# Patient Record
Sex: MALE | Race: BLACK OR AFRICAN AMERICAN | NOT HISPANIC OR LATINO | Employment: UNEMPLOYED | URBAN - METROPOLITAN AREA
[De-identification: names, ages, dates, MRNs, and addresses within clinical notes are randomized per-mention and may not be internally consistent; named-entity substitution may affect disease eponyms.]

---

## 2023-08-18 ENCOUNTER — HOSPITAL ENCOUNTER (INPATIENT)
Facility: CLINIC | Age: 58
LOS: 3 days | Discharge: HOME | End: 2023-08-21
Attending: FAMILY MEDICINE | Admitting: PSYCHIATRY & NEUROLOGY
Payer: COMMERCIAL

## 2023-08-18 DIAGNOSIS — F10.239 ALCOHOL DEPENDENCE WITH WITHDRAWAL WITH COMPLICATION (H): ICD-10-CM

## 2023-08-18 LAB
ALBUMIN SERPL BCG-MCNC: 4.6 G/DL (ref 3.5–5.2)
ALP SERPL-CCNC: 76 U/L (ref 40–129)
ALT SERPL W P-5'-P-CCNC: 18 U/L (ref 0–70)
AMPHETAMINES UR QL SCN: ABNORMAL
ANION GAP SERPL CALCULATED.3IONS-SCNC: 10 MMOL/L (ref 7–15)
AST SERPL W P-5'-P-CCNC: 21 U/L (ref 0–45)
BARBITURATES UR QL SCN: ABNORMAL
BASOPHILS # BLD AUTO: 0 10E3/UL (ref 0–0.2)
BASOPHILS NFR BLD AUTO: 1 %
BENZODIAZ UR QL SCN: ABNORMAL
BILIRUB SERPL-MCNC: 1 MG/DL
BUN SERPL-MCNC: 11.7 MG/DL (ref 8–23)
BZE UR QL SCN: ABNORMAL
CALCIUM SERPL-MCNC: 9.8 MG/DL (ref 8.6–10)
CANNABINOIDS UR QL SCN: ABNORMAL
CHLORIDE SERPL-SCNC: 105 MMOL/L (ref 98–107)
CREAT SERPL-MCNC: 1.29 MG/DL (ref 0.67–1.17)
DEPRECATED HCO3 PLAS-SCNC: 25 MMOL/L (ref 22–29)
EOSINOPHIL # BLD AUTO: 0.1 10E3/UL (ref 0–0.7)
EOSINOPHIL NFR BLD AUTO: 2 %
ERYTHROCYTE [DISTWIDTH] IN BLOOD BY AUTOMATED COUNT: 12.7 % (ref 10–15)
ETHANOL SERPL-MCNC: <0.01 G/DL
GFR SERPL CREATININE-BSD FRML MDRD: 64 ML/MIN/1.73M2
GLUCOSE SERPL-MCNC: 100 MG/DL (ref 70–99)
HCT VFR BLD AUTO: 45.7 % (ref 40–53)
HGB BLD-MCNC: 15.6 G/DL (ref 13.3–17.7)
IMM GRANULOCYTES # BLD: 0 10E3/UL
IMM GRANULOCYTES NFR BLD: 0 %
LIPASE SERPL-CCNC: 32 U/L (ref 13–60)
LYMPHOCYTES # BLD AUTO: 1.9 10E3/UL (ref 0.8–5.3)
LYMPHOCYTES NFR BLD AUTO: 43 %
MCH RBC QN AUTO: 32 PG (ref 26.5–33)
MCHC RBC AUTO-ENTMCNC: 34.1 G/DL (ref 31.5–36.5)
MCV RBC AUTO: 94 FL (ref 78–100)
MONOCYTES # BLD AUTO: 0.4 10E3/UL (ref 0–1.3)
MONOCYTES NFR BLD AUTO: 8 %
NEUTROPHILS # BLD AUTO: 2.1 10E3/UL (ref 1.6–8.3)
NEUTROPHILS NFR BLD AUTO: 46 %
NRBC # BLD AUTO: 0 10E3/UL
NRBC BLD AUTO-RTO: 0 /100
OPIATES UR QL SCN: ABNORMAL
PLATELET # BLD AUTO: 263 10E3/UL (ref 150–450)
POTASSIUM SERPL-SCNC: 4.3 MMOL/L (ref 3.4–5.3)
PROT SERPL-MCNC: 7.8 G/DL (ref 6.4–8.3)
RBC # BLD AUTO: 4.87 10E6/UL (ref 4.4–5.9)
SODIUM SERPL-SCNC: 140 MMOL/L (ref 136–145)
WBC # BLD AUTO: 4.5 10E3/UL (ref 4–11)

## 2023-08-18 PROCEDURE — 82077 ASSAY SPEC XCP UR&BREATH IA: CPT | Performed by: FAMILY MEDICINE

## 2023-08-18 PROCEDURE — 80307 DRUG TEST PRSMV CHEM ANLYZR: CPT | Performed by: FAMILY MEDICINE

## 2023-08-18 PROCEDURE — 36415 COLL VENOUS BLD VENIPUNCTURE: CPT | Performed by: FAMILY MEDICINE

## 2023-08-18 PROCEDURE — 250N000013 HC RX MED GY IP 250 OP 250 PS 637: Performed by: FAMILY MEDICINE

## 2023-08-18 PROCEDURE — 99285 EMERGENCY DEPT VISIT HI MDM: CPT | Performed by: FAMILY MEDICINE

## 2023-08-18 PROCEDURE — HZ2ZZZZ DETOXIFICATION SERVICES FOR SUBSTANCE ABUSE TREATMENT: ICD-10-PCS | Performed by: PSYCHIATRY & NEUROLOGY

## 2023-08-18 PROCEDURE — 85041 AUTOMATED RBC COUNT: CPT | Performed by: FAMILY MEDICINE

## 2023-08-18 PROCEDURE — 83690 ASSAY OF LIPASE: CPT | Performed by: FAMILY MEDICINE

## 2023-08-18 PROCEDURE — 80053 COMPREHEN METABOLIC PANEL: CPT | Performed by: FAMILY MEDICINE

## 2023-08-18 PROCEDURE — 99285 EMERGENCY DEPT VISIT HI MDM: CPT | Mod: 25 | Performed by: FAMILY MEDICINE

## 2023-08-18 PROCEDURE — 250N000013 HC RX MED GY IP 250 OP 250 PS 637: Performed by: PSYCHIATRY & NEUROLOGY

## 2023-08-18 PROCEDURE — 128N000004 HC R&B CD ADULT

## 2023-08-18 RX ORDER — NAPROXEN 500 MG/1
1 TABLET ORAL 2 TIMES DAILY WITH MEALS
Status: ON HOLD | COMMUNITY
Start: 2023-04-10 | End: 2023-08-21

## 2023-08-18 RX ORDER — FOLIC ACID 1 MG/1
1 TABLET ORAL DAILY
Status: DISCONTINUED | OUTPATIENT
Start: 2023-08-18 | End: 2023-08-21 | Stop reason: HOSPADM

## 2023-08-18 RX ORDER — ATENOLOL 50 MG/1
50 TABLET ORAL DAILY PRN
Status: DISCONTINUED | OUTPATIENT
Start: 2023-08-18 | End: 2023-08-21 | Stop reason: HOSPADM

## 2023-08-18 RX ORDER — LOSARTAN POTASSIUM 50 MG/1
1 TABLET ORAL DAILY
Status: ON HOLD | COMMUNITY
Start: 2022-12-08 | End: 2023-08-21

## 2023-08-18 RX ORDER — MULTIPLE VITAMINS W/ MINERALS TAB 9MG-400MCG
1 TAB ORAL DAILY
Status: DISCONTINUED | OUTPATIENT
Start: 2023-08-18 | End: 2023-08-21 | Stop reason: HOSPADM

## 2023-08-18 RX ORDER — ONDANSETRON 4 MG/1
4 TABLET, ORALLY DISINTEGRATING ORAL EVERY 6 HOURS PRN
Status: DISCONTINUED | OUTPATIENT
Start: 2023-08-18 | End: 2023-08-21 | Stop reason: HOSPADM

## 2023-08-18 RX ORDER — DIAZEPAM 5 MG
5-20 TABLET ORAL EVERY 30 MIN PRN
Status: DISCONTINUED | OUTPATIENT
Start: 2023-08-18 | End: 2023-08-21 | Stop reason: HOSPADM

## 2023-08-18 RX ORDER — NICOTINE 21 MG/24HR
1 PATCH, TRANSDERMAL 24 HOURS TRANSDERMAL DAILY
Status: DISCONTINUED | OUTPATIENT
Start: 2023-08-18 | End: 2023-08-21 | Stop reason: HOSPADM

## 2023-08-18 RX ORDER — MAGNESIUM HYDROXIDE/ALUMINUM HYDROXICE/SIMETHICONE 120; 1200; 1200 MG/30ML; MG/30ML; MG/30ML
30 SUSPENSION ORAL EVERY 4 HOURS PRN
Status: DISCONTINUED | OUTPATIENT
Start: 2023-08-18 | End: 2023-08-21 | Stop reason: HOSPADM

## 2023-08-18 RX ORDER — TRAZODONE HYDROCHLORIDE 50 MG/1
50 TABLET, FILM COATED ORAL
Status: DISCONTINUED | OUTPATIENT
Start: 2023-08-18 | End: 2023-08-19

## 2023-08-18 RX ORDER — AMOXICILLIN 250 MG
1 CAPSULE ORAL 2 TIMES DAILY PRN
Status: DISCONTINUED | OUTPATIENT
Start: 2023-08-18 | End: 2023-08-21 | Stop reason: HOSPADM

## 2023-08-18 RX ORDER — LORAZEPAM 1 MG/1
1-4 TABLET ORAL EVERY 30 MIN PRN
Status: DISCONTINUED | OUTPATIENT
Start: 2023-08-18 | End: 2023-08-18 | Stop reason: ALTCHOICE

## 2023-08-18 RX ORDER — HYDROXYZINE HYDROCHLORIDE 25 MG/1
25 TABLET, FILM COATED ORAL EVERY 4 HOURS PRN
Status: DISCONTINUED | OUTPATIENT
Start: 2023-08-18 | End: 2023-08-21 | Stop reason: HOSPADM

## 2023-08-18 RX ORDER — ACETAMINOPHEN 325 MG/1
650 TABLET ORAL EVERY 4 HOURS PRN
Status: DISCONTINUED | OUTPATIENT
Start: 2023-08-18 | End: 2023-08-21 | Stop reason: HOSPADM

## 2023-08-18 RX ADMIN — TRAZODONE HYDROCHLORIDE 50 MG: 50 TABLET ORAL at 20:13

## 2023-08-18 RX ADMIN — LORAZEPAM 1 MG: 1 TABLET ORAL at 17:00

## 2023-08-18 RX ADMIN — HYDROXYZINE HYDROCHLORIDE 25 MG: 25 TABLET, FILM COATED ORAL at 20:13

## 2023-08-18 RX ADMIN — THIAMINE HCL TAB 100 MG 100 MG: 100 TAB at 20:13

## 2023-08-18 RX ADMIN — MULTIPLE VITAMINS W/ MINERALS TAB 1 TABLET: TAB at 20:13

## 2023-08-18 RX ADMIN — FOLIC ACID 1 MG: 1 TABLET ORAL at 20:13

## 2023-08-18 ASSESSMENT — ACTIVITIES OF DAILY LIVING (ADL)
ADLS_ACUITY_SCORE: 28
CONCENTRATING,_REMEMBERING_OR_MAKING_DECISIONS_DIFFICULTY: NO
WEAR_GLASSES_OR_BLIND: NO
ADLS_ACUITY_SCORE: 28
TOILETING_ISSUES: NO
ADLS_ACUITY_SCORE: 35
FALL_HISTORY_WITHIN_LAST_SIX_MONTHS: NO
CHANGE_IN_FUNCTIONAL_STATUS_SINCE_ONSET_OF_CURRENT_ILLNESS/INJURY: NO
DIFFICULTY_EATING/SWALLOWING: NO
DOING_ERRANDS_INDEPENDENTLY_DIFFICULTY: NO
DRESSING/BATHING_DIFFICULTY: NO
ADLS_ACUITY_SCORE: 35
ADLS_ACUITY_SCORE: 35
EQUIPMENT_CURRENTLY_USED_AT_HOME: NONE;CANE, STRAIGHT
WALKING_OR_CLIMBING_STAIRS_DIFFICULTY: NO

## 2023-08-18 NOTE — ED PROVIDER NOTES
"    [facility] EMERGENCY DEPARTMENT ([facility])    8/18/23      ED PROVIDER NOTE      History     Chief Complaint   Patient presents with    Drug / Alcohol Assessment     Pt seeking detox from drugs and alcohol.  Last drink last night about 2200.  Drinks 6 pack of beer per day with a couple of shots.  Denies history of seizures.  Using crack and THC     The history is provided by the patient and medical records.     Adonay Godwin is a 57 year old male with a past medical history significant for substance abuse who presents to the ED seeking detox from drugs and alcohol.  Patient states that he has currently been drinking a sixpack of beer and a couple of shots of hard liquor per day.  He reports using crack and THC as well.  Patient denies any history of withdrawal seizures. Last drink was yesterday evening.  Patient is starting to feel somewhat shaky states that generally if he feels this way he starts to drink again so has not had any significant withdrawal because he continues to drink regularly.    Past Medical History  Past Medical History:   Diagnosis Date    Hypertension     Substance abuse (H)      Past Surgical History:   Procedure Laterality Date    ORTHOPEDIC SURGERY       losartan (COZAAR) 50 MG tablet  naproxen (NAPROSYN) 500 MG tablet      No Known Allergies  Family History  No family history on file.  Social History   Social History     Tobacco Use    Smoking status: Every Day     Packs/day: 0.25     Types: Cigarettes    Smokeless tobacco: Never   Substance Use Topics    Alcohol use: Yes    Drug use: Yes     Types: Marijuana, \"Crack\" cocaine      Past medical history, past surgical history, medications, allergies, family history, and social history were reviewed with the patient. No additional pertinent items.          Physical Exam   BP: 125/89  Pulse: 77  Temp: 98  F (36.7  C)  Resp: 16  Height: 182.9 cm (6')  Weight: 78.2 kg (172 lb 8 oz)  SpO2: 100 %  Physical Exam  Constitutional:       " General: He is not in acute distress.     Appearance: Normal appearance. He is not toxic-appearing.   HENT:      Head: Atraumatic.   Eyes:      General: No scleral icterus.     Conjunctiva/sclera: Conjunctivae normal.   Cardiovascular:      Rate and Rhythm: Normal rate.      Heart sounds: Normal heart sounds.   Pulmonary:      Effort: Pulmonary effort is normal. No respiratory distress.      Breath sounds: Normal breath sounds.   Abdominal:      Palpations: Abdomen is soft.      Tenderness: There is no abdominal tenderness.   Musculoskeletal:         General: No deformity.      Cervical back: Neck supple.   Skin:     General: Skin is warm.   Neurological:      General: No focal deficit present.      Mental Status: He is alert and oriented to person, place, and time.      Sensory: No sensory deficit.      Motor: No weakness.      Coordination: Coordination normal.   Psychiatric:         Mood and Affect: Mood is anxious.         Speech: Speech normal.         Behavior: Behavior is cooperative.         Thought Content: Thought content does not include homicidal or suicidal ideation.         ED Course, Procedures, & Data      Procedures       Medications   LORazepam (ATIVAN) tablet 1-4 mg (has no administration in time range)     Labs Ordered and Resulted from Time of ED Arrival to Time of ED Departure   COMPREHENSIVE METABOLIC PANEL - Abnormal       Result Value    Sodium 140      Potassium 4.3      Chloride 105      Carbon Dioxide (CO2) 25      Anion Gap 10      Urea Nitrogen 11.7      Creatinine 1.29 (*)     Calcium 9.8      Glucose 100 (*)     Alkaline Phosphatase 76      AST 21      ALT 18      Protein Total 7.8      Albumin 4.6      Bilirubin Total 1.0      GFR Estimate 64     DRUG ABUSE SCREEN 1 URINE (ED) - Abnormal    Amphetamines Urine Screen Negative      Barbituates Urine Screen Negative      Benzodiazepine Urine Screen Negative      Cannabinoids Urine Screen Positive (*)     Cocaine Urine Screen Positive  (*)     Opiates Urine Screen Negative     LIPASE - Normal    Lipase 32     ETHYL ALCOHOL LEVEL - Normal    Alcohol ethyl <0.01     CBC WITH PLATELETS AND DIFFERENTIAL    WBC Count 4.5      RBC Count 4.87      Hemoglobin 15.6      Hematocrit 45.7      MCV 94      MCH 32.0      MCHC 34.1      RDW 12.7      Platelet Count 263      % Neutrophils 46      % Lymphocytes 43      % Monocytes 8      % Eosinophils 2      % Basophils 1      % Immature Granulocytes 0      NRBCs per 100 WBC 0      Absolute Neutrophils 2.1      Absolute Lymphocytes 1.9      Absolute Monocytes 0.4      Absolute Eosinophils 0.1      Absolute Basophils 0.0      Absolute Immature Granulocytes 0.0      Absolute NRBCs 0.0       No orders to display          Critical care was not performed.     Medical Decision Making  The patient's presentation was of high complexity (a chronic illness severe exacerbation, progression, or side effect of treatment).    The patient's evaluation involved:  review of external note(s) from 3+ sources (see separate area of note for details)    The patient's management necessitated high risk (a decision regarding hospitalization).    Assessment & Plan        I have reviewed the nursing notes. I have reviewed the findings, diagnosis, plan and need for follow up with the patient.    Patient with chronic alcohol dependence as well as cocaine and marijuana abuse at this time requesting detox has a treatment center lined up but needs to be in detox for 2 to 3 days prior to starting his treatment patient will be admitted to station 3A    Final diagnoses:   Alcohol dependence with withdrawal with complication (H)   IMari, am serving as a trained medical scribe to document services personally performed by Sancho Stevens MD, based on the provider's statements to me.      Sancho LOMELI MD, was physically present and have reviewed and verified the accuracy of this note documented by Mari Dewey.     Sancho Bird  MD Rodney  [facility] EMERGENCY DEPARTMENT  8/18/2023     Sancho Stevens MD  08/18/23 2834

## 2023-08-19 LAB
GGT SERPL-CCNC: 24 U/L (ref 8–61)
TSH SERPL DL<=0.005 MIU/L-ACNC: 0.19 UIU/ML (ref 0.3–4.2)

## 2023-08-19 PROCEDURE — H2035 A/D TX PROGRAM, PER HOUR: HCPCS | Mod: HQ

## 2023-08-19 PROCEDURE — 99223 1ST HOSP IP/OBS HIGH 75: CPT | Mod: AI | Performed by: PSYCHIATRY & NEUROLOGY

## 2023-08-19 PROCEDURE — 250N000013 HC RX MED GY IP 250 OP 250 PS 637: Performed by: PSYCHIATRY & NEUROLOGY

## 2023-08-19 PROCEDURE — 84443 ASSAY THYROID STIM HORMONE: CPT | Performed by: PSYCHIATRY & NEUROLOGY

## 2023-08-19 PROCEDURE — 99254 IP/OBS CNSLTJ NEW/EST MOD 60: CPT | Performed by: PHYSICIAN ASSISTANT

## 2023-08-19 PROCEDURE — 82977 ASSAY OF GGT: CPT | Performed by: PSYCHIATRY & NEUROLOGY

## 2023-08-19 PROCEDURE — 36415 COLL VENOUS BLD VENIPUNCTURE: CPT | Performed by: PSYCHIATRY & NEUROLOGY

## 2023-08-19 PROCEDURE — 128N000004 HC R&B CD ADULT

## 2023-08-19 PROCEDURE — 250N000013 HC RX MED GY IP 250 OP 250 PS 637: Performed by: PHYSICIAN ASSISTANT

## 2023-08-19 RX ORDER — LOSARTAN POTASSIUM 50 MG/1
50 TABLET ORAL DAILY
Status: DISCONTINUED | OUTPATIENT
Start: 2023-08-19 | End: 2023-08-21 | Stop reason: HOSPADM

## 2023-08-19 RX ORDER — QUETIAPINE FUMARATE 50 MG/1
50 TABLET, FILM COATED ORAL AT BEDTIME
Status: DISCONTINUED | OUTPATIENT
Start: 2023-08-19 | End: 2023-08-21 | Stop reason: HOSPADM

## 2023-08-19 RX ADMIN — FOLIC ACID 1 MG: 1 TABLET ORAL at 09:18

## 2023-08-19 RX ADMIN — LOSARTAN POTASSIUM 50 MG: 50 TABLET, FILM COATED ORAL at 09:18

## 2023-08-19 RX ADMIN — MULTIPLE VITAMINS W/ MINERALS TAB 1 TABLET: TAB at 09:18

## 2023-08-19 RX ADMIN — QUETIAPINE FUMARATE 50 MG: 50 TABLET ORAL at 20:51

## 2023-08-19 RX ADMIN — THIAMINE HCL TAB 100 MG 100 MG: 100 TAB at 09:18

## 2023-08-19 ASSESSMENT — ACTIVITIES OF DAILY LIVING (ADL)
ADLS_ACUITY_SCORE: 28
HYGIENE/GROOMING: INDEPENDENT
ORAL_HYGIENE: INDEPENDENT
ADLS_ACUITY_SCORE: 28
DRESS: INDEPENDENT
ORAL_HYGIENE: INDEPENDENT
LAUNDRY: WITH SUPERVISION
HYGIENE/GROOMING: INDEPENDENT
DRESS: INDEPENDENT
ADLS_ACUITY_SCORE: 28

## 2023-08-19 NOTE — PLAN OF CARE
Detox Status: Out of detox    MSSA: 2 and 1    COWS: N/A    Mood and Affect: Blunted flat affect.    LOC and Orientation: Alert and oriented to person, place, time and situation.    Behavior and Interaction: Semi engaged with select peers, mostly isolative to self attended group.    Mental Health Symptoms: Denies all mental health symptoms. Reports feeling sad.    Medical Concerns:None.    Patient's Other Concerns: Patient would like to discharge back to sober house.     Medication Compliant: Yes    PRN: None given.    Medication Side Effects: None reported or observed.    Food and Fluid Intake: Adequate.     Elimination: No problem reported, last bowel movement, today.    Self Care: Fairly groomed.    Vital Signs: Denies pain.  /89 (BP Location: Right arm)   Pulse 63   Temp 98.6  F (37  C) (Oral)   Resp 16   Ht 1.829 m (6')   Wt 78.2 kg (172 lb 8 oz)   SpO2 100%   BMI 23.40 kg/m        Problem: Alcohol Withdrawal  Goal: Alcohol Withdrawal Symptom Control  Outcome: Progressing   Goal Outcome Evaluation:    Plan of Care Reviewed With: patient

## 2023-08-19 NOTE — PROGRESS NOTES
08/18/23 1944   Patient Belongings   Did you bring any home meds/supplements to the hospital?  No   Patient Belongings other (see comments)  (storage bin, med room bin)   Belongings Search Yes   Clothing Search Yes   Second Staff Domenico Cutler     Storage bin: shoes, belt, charging cord  Med room bin: cell home, wallet, env # [id number]: 4 visa, 3 mastercard, social security card  A               Admission:  I am responsible for any personal items that are not sent to the safe or pharmacy.  [facility] is not responsible for loss, theft or damage of any property in my possession.    Signature:  _________________________________ Date: _______  Time: _____                                              Staff Signature:  ____________________________ Date: ________  Time: _____      2nd Staff person, if patient is unable/unwilling to sign:    Signature: ________________________________ Date: ________  Time: _____     Discharge:  [facility] has returned all of my personal belongings:    Signature: _________________________________ Date: ________  Time: _____                                          Staff Signature:  ____________________________ Date: ________  Time: _____

## 2023-08-19 NOTE — PLAN OF CARE
Goal Outcome Evaluation:    Plan of Care Reviewed With: patient      Pt MSSA is 5 and 4. He spent the majority of the day in bed. He denies SI, HI, AVH, and pain. He ate 100% of meals. He denies questions or concerns

## 2023-08-19 NOTE — H&P
Adonay Godwin is a 57 year old male      History was provided by PATEINT who was a poor historian he is in bed keeps his eyes closed and answers questions very minimally  CHIEF COMPLAINT: Cocaine    HISTORY OF PRESENT ILLNESS:      Per ED Provider Note dated 8/18/2023:     Drug / Alcohol Assessment       Pt seeking detox from drugs and alcohol.  Last drink last night about 2200.  Drinks 6 pack of beer per day with a couple of shots.  Denies history of seizures.  Using crack and THC      The history is provided by the patient and medical records.      Adonay Godwin is a 57 year old male with a past medical history significant for substance abuse who presents to the ED seeking detox from drugs and alcohol.  Patient states that he has currently been drinking a sixpack of beer and a couple of shots of hard liquor per day.  He reports using crack and THC as well.  Patient denies any history of withdrawal seizures. Last drink was yesterday evening.  Patient is starting to feel somewhat shaky states that generally if he feels this way he starts to drink again so has not had any significant withdrawal because he continues to drink regularly.          Per my interview with patient:    Patient is a 57-year-old -American male who has chronic bipolar affective disorder alcohol dependence.  Patient reports his drug of choice is cocaine but he is also been using alcohol for the past 2 months he uses 6 pack.    Patient has tolerance, withdrawal, progressive use, loss of control, spending more time and more amount than intended. Patient has made attempts to quit, is experiencing cravings, and reports negative consequences.      He has no history of DTs and seizures     He has 40 years of cocaine using he smokes marijuana but does not smoke any nicotine      Denies thoughts of suicide or harming others.      Denies auditory or visual hallucinations.     P       Psychiatric Review of Systems:   Depression: Patient has history of  bipolar affect disorder    Denies: depressed mood, suicidal ideation, decreased interest, changes in sleep, changes in appetite, guilt, hopelessness, helplessness, impaired concentration, decreased energy, irritability.  Raeann:   Denies: sleeplessness, increased goal-directed activities, abrupt increase in energy, pressured speech   impulsiveness, racing thoughts, increased goal-directed activities, pressured speech, increase in energy  Raeann Feeling euphoric,Distractible,Impulsive,Grandiose,Talking excessively,Have energy without sleeping,Mood swings,Irritability  Psychosis:     Denies: visual hallucinations, auditory hallucinations, paranoia        PSYCHIATRIC HISTORY     Previous diagnoses: Bipolar affective disorder      Symptoms in relation to substance use :symptoms present without use present absent    Past court commitments: none  SIB /SUICIDE ATTEMPTS NONE  Psych Hosp : He reports no psychiatric hospitalized but not able to elaborate on when he was alone I saw his sugars were  Outpatient Programs he is not able to answer questions pertaining to  Inpatient cd trt/Out pt cd trt          SOCIAL HISTORY                                                                         Patient is unemployed homeless        FAMILY HISTORY: No family history on file.  Family Mental Health History-  none    Substance Use Problems -denied             PTA Medications:     Medications Prior to Admission   Medication Sig Dispense Refill Last Dose    losartan (COZAAR) 50 MG tablet Take 1 tablet by mouth daily   Past Week    naproxen (NAPROSYN) 500 MG tablet Take 1 tablet by mouth 2 times daily (with meals) TAKE 1 TABLET(500 MG) BY MOUTH IN THE MORNING AND IN THE EVENING WITH MEALS   8/17/2023          Allergies:   No Known Allergies       Labs:     Recent Results (from the past 48 hour(s))   Comprehensive metabolic panel    Collection Time: 08/18/23  1:54 PM   Result Value Ref Range    Sodium 140 136 - 145 mmol/L    Potassium 4.3  3.4 - 5.3 mmol/L    Chloride 105 98 - 107 mmol/L    Carbon Dioxide (CO2) 25 22 - 29 mmol/L    Anion Gap 10 7 - 15 mmol/L    Urea Nitrogen 11.7 8.0 - 23.0 mg/dL    Creatinine 1.29 (H) 0.67 - 1.17 mg/dL    Calcium 9.8 8.6 - 10.0 mg/dL    Glucose 100 (H) 70 - 99 mg/dL    Alkaline Phosphatase 76 40 - 129 U/L    AST 21 0 - 45 U/L    ALT 18 0 - 70 U/L    Protein Total 7.8 6.4 - 8.3 g/dL    Albumin 4.6 3.5 - 5.2 g/dL    Bilirubin Total 1.0 <=1.2 mg/dL    GFR Estimate 64 >60 mL/min/1.73m2   Lipase    Collection Time: 08/18/23  1:54 PM   Result Value Ref Range    Lipase 32 13 - 60 U/L   Ethyl Alcohol Level    Collection Time: 08/18/23  1:54 PM   Result Value Ref Range    Alcohol ethyl <0.01 <=0.01 g/dL   CBC with platelets and differential    Collection Time: 08/18/23  1:54 PM   Result Value Ref Range    WBC Count 4.5 4.0 - 11.0 10e3/uL    RBC Count 4.87 4.40 - 5.90 10e6/uL    Hemoglobin 15.6 13.3 - 17.7 g/dL    Hematocrit 45.7 40.0 - 53.0 %    MCV 94 78 - 100 fL    MCH 32.0 26.5 - 33.0 pg    MCHC 34.1 31.5 - 36.5 g/dL    RDW 12.7 10.0 - 15.0 %    Platelet Count 263 150 - 450 10e3/uL    % Neutrophils 46 %    % Lymphocytes 43 %    % Monocytes 8 %    % Eosinophils 2 %    % Basophils 1 %    % Immature Granulocytes 0 %    NRBCs per 100 WBC 0 <1 /100    Absolute Neutrophils 2.1 1.6 - 8.3 10e3/uL    Absolute Lymphocytes 1.9 0.8 - 5.3 10e3/uL    Absolute Monocytes 0.4 0.0 - 1.3 10e3/uL    Absolute Eosinophils 0.1 0.0 - 0.7 10e3/uL    Absolute Basophils 0.0 0.0 - 0.2 10e3/uL    Absolute Immature Granulocytes 0.0 <=0.4 10e3/uL    Absolute NRBCs 0.0 10e3/uL   Drug abuse screen 1 urine (ED)    Collection Time: 08/18/23  2:08 PM   Result Value Ref Range    Amphetamines Urine Screen Negative Screen Negative    Barbituates Urine Screen Negative Screen Negative    Benzodiazepine Urine Screen Negative Screen Negative    Cannabinoids Urine Screen Positive (A) Screen Negative    Cocaine Urine Screen Positive (A) Screen Negative    Opiates  Urine Screen Negative Screen Negative         /76   Pulse 81   Temp 98.7  F (37.1  C) (Oral)   Resp 16   Ht 1.829 m (6')   Wt 78.2 kg (172 lb 8 oz)   SpO2 100%   BMI 23.40 kg/m    Weight is 172 lbs 8 oz  Body mass index is 23.4 kg/m .    Physical Exam:     ROS: 10 point ROS neg other than the symptoms noted above in the HPI.            Past Medical History:   PAST MEDICAL HISTORY:   Past Medical History:   Diagnosis Date    Hypertension     Substance abuse (H)        PAST SURGICAL HISTORY:   Past Surgical History:   Procedure Laterality Date    ORTHOPEDIC SURGERY         -    -           MENTAL STATUS EXAM:      Constitutional: General appearance of patient:  Appearance:  awake, alert, appeared as age stated, adequate groomed and slightly unkempt  Attitude:  cooperative  Eye Contact:  good  Mood: Tired  Affect:  congruent   Speech:  clear, coherent normal rate   Psychomotor Behavior:  no evidence of tardive dyskinesia, dystonia, or tics  Thought Process:  logical, linear and goal oriented  Associations:  no loose associations  Thought Content:  no evidence of psychotic thought and active suicidal ideation present  Denied any active suicidal /homicidation ideation plan intent   Insight:  fair  Judgment:  fair  Oriented to:  time, person, and place  Attention Span and Concentration:  intact  Recent and Remote Memory:  intact  Language:  english with appropriate syntax and vocabulary  Fund of Knowledge: appropriate  Muscle Strength and Tone: normal  Gait and Station: Normal     There are no abnormal or psychotic thoughts, no preoccupations, no overvalued ideas, no rumination, no obsessions, no compulsions, no somatic concerns, no hypochrondriasis, no ideas of reference, and no delusions.  Patient denies homicidal thoughts.   Patient denies suicidal thoughts.  Patient appears to have good judgment and good insight.     Musculoskeletal: Patient shows no abnormalities of motor activity: there is no tremor, no  tic, and no dystonia.  There is no apparent muscle atrophy, strength and tone appear normal, and there are no abnormal movements.  Patient has normal gait and stance.    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     Patient has been unable to stop using drugs in the community due to both physical and psychological symptoms.  Continued use will put the patient at risk for medical and/or psychiatric complications.      Inpatient psychiatric hospitalization is warranted at this time for safety, stabilization, and possible adjustment in medications.       Diagnoses:   Alcohol abuse  Alcohol withdrawal  Cocaine abuse  History of bipolar affective disorder       Plan:   Problem list  1#alcohol abuse     - MSSA protocol using Valium for management of alcohol withdrawal    - Continue thiamine, folate, and multivitamin daily    MSSA    Eating Disturbances: ate and enjoyed all of it or not applicable  Tremor: 1 - not visibly apparent but can be felt by the examiner placing his fingertip slightly against the patient's fingertips  Sleep Disturbance: slept through the night or not applicable  Clouding of Sensorium: no evidence  Hallucinations: 0 - none  Quality of Contact: 0 - awareness of examiner and people around him/her  Agitation: 0 - normal activity  Paroxysmal Sweats: 1 - barely perceptible sweating  Temperature: 99.5 or below  Pulse: 2 - 80 to 89  Total MSSA Score: 5    2#we will have symptomatic detox from cocaine  For his bipolar affect disorder patient is willing to take Seroquel will start with 50 mg at bedtime      - Consider anti-craving medications prior to discharge. Pt willing to review additional information about both naltrexone and Antabuse.  -Alcohol withdrawal nausea prn Zofran as needed for nausea      hydroxyzine 25 mg q4h prn for acute anxiety  Trazodone 50 mg at bedtime prn for sleep disturbances       Patient has been unable to stop using drugs in the community due to both physical and psychological symptoms.  Continued use will put the patient at risk for medical and/or psychiatric complications.    I HAVE REVIEWED LABS WITH PT AND TALKED ABOUT RESULTS WITH PT  I HAVE REVIEWED AND SUMMARIZED OLD RECORDS including his medication reconcilation of his home medications  and PDMP was reviewed  I HAVE SPOKEN WITH RN ABOUT MEDICATIONS AND DETOX SCORES  I HAVE SPOKEN WITH CM ABOUT PTS TREATMENT OPTIONS               Laboratory/Imaging:    Liver Function Studies -   Recent Labs   Lab Test 08/18/23  1354   PROTTOTAL 7.8   ALBUMIN 4.6   BILITOTAL 1.0   ALKPHOS 76   AST 21   ALT 18      Last Comprehensive Metabolic Panel:  Sodium   Date Value Ref Range Status   08/18/2023 140 136 - 145 mmol/L Final     Potassium   Date Value Ref Range Status   08/18/2023 4.3 3.4 - 5.3 mmol/L Final     Chloride   Date Value Ref Range Status   08/18/2023 105 98 - 107 mmol/L Final     Carbon Dioxide (CO2)   Date Value Ref Range Status   08/18/2023 25 22 - 29 mmol/L Final     Anion Gap   Date Value Ref Range Status   08/18/2023 10 7 - 15 mmol/L Final     Glucose   Date Value Ref Range Status   08/18/2023 100 (H) 70 - 99 mg/dL Final     Urea Nitrogen   Date Value Ref Range Status   08/18/2023 11.7 8.0 - 23.0 mg/dL Final     Creatinine   Date Value Ref Range Status   08/18/2023 1.29 (H) 0.67 - 1.17 mg/dL Final     GFR Estimate   Date Value Ref Range Status   08/18/2023 64 >60 mL/min/1.73m2 Final     Calcium   Date Value Ref Range Status   08/18/2023 9.8 8.6 - 10.0 mg/dL Final     Bilirubin Total   Date Value Ref Range Status   08/18/2023 1.0 <=1.2 mg/dL Final     Alkaline Phosphatase   Date Value Ref Range Status   08/18/2023 76 40 - 129 U/L Final     ALT   Date Value Ref Range Status   08/18/2023 18 0 - 70 U/L Final     Comment:      "Reference intervals for this test were updated on 6/12/2023 to more accurately reflect our healthy population. There may be differences in the flagging of prior results with similar values performed with this method. Interpretation of those prior results can be made in the context of the updated reference intervals.       AST   Date Value Ref Range Status   08/18/2023 21 0 - 45 U/L Final     Comment:     Reference intervals for this test were updated on 6/12/2023 to more accurately reflect our healthy population. There may be differences in the flagging of prior results with similar values performed with this method. Interpretation of those prior results can be made in the context of the updated reference intervals.                   Medical treatment/interventions:  Medical concerns: As above    - Consults: IM consult placed. Appreciate assistance.     Legal Status: Voluntary     Safety Assessment:   Checks: Status 15  Pt has not required locked seclusion or restraints in the past 24 hours to maintain safety, please refer to RN documentation for further details.    The risks, benefits, alternatives and side effects have been discussed and are understood by the patient.       Patient will be treated in therapeutic milieu with appropriate individual and group therapies as described.      Clinical Global Impressions  First:7     Most recent:7     Disposition: Pending clinical stabilization. Pt does  appear interested in COMPLETE DETOX AND DO TRT  Length of stay 3-5 days    Attestation:  Patient has been seen and evaluated by me, Dr Gia Cullen MD  The patient was counseled on nature of illness and treatment plan/options  Care was coordinated with treatment team          \"Much or all of the text in this note was generated through the use of Dragon Dictate voice to text software. Errors in spelling or words which appear to be out of contact are unintentional, may be present due having escaped editing\"    "

## 2023-08-19 NOTE — PROGRESS NOTES
Met with pt to discuss discharge planning. Pt in bed and kept eyes closed but did respond to writer. Pt reports a plan to return to his sober house. Pt reports he confirmed he can return. Pt declined any treatment referral at this time. Pt declined needing anything else from case management, but was somnolent at time of conversation. Writer will f/u tomorrow to confirm pt does not need further resources.     Kami Childress, [facility], LADC

## 2023-08-19 NOTE — PROGRESS NOTES
"   08/19/23 1823   Group Therapy Session   Group Attendance attended group session   Time Session Began 1645   Time Session Ended 1735   Total Time (minutes) 50   Total # Attendees 8   Group Type psychotherapeutic   Group Topic Covered disease of addiction/choices in recovery;relapse prevention   Group Session Detail Process- Tree of addiction   Patient Response/Contribution cooperative with task;discussed personal experience with topic;listened actively;expressed readiness to alter behaviors   Patient Participation Detail Mood \" just here\", he did participate thoughtfully after saying he had nothing to say.       "

## 2023-08-19 NOTE — PLAN OF CARE
Problem: Fall Injury Risk  Goal: Absence of Fall and Fall-Related Injury  Outcome: Progressing   Goal Outcome Evaluation:                      Report taken from ED nurse. Patient arrived on unit 3 AW at about 1910 ambulating by himself. He appeared anxious and worried, however, he was calm and cooperative throughout the search and admission process. Patient compliant with vitals and medications administration. Prn hydroxyzine for anxiety and trazodone for sleep administered along with scheduled vitamins.     Patient arrived on unit seeking detox for alcohol intoxication. Patient reported per ED notes that he drinks about 12 pack of alcohol a day for the last year. He has no history of DT or seizure. Patient JAYLEEN was 0.01.     Patient on unit noted that he is just very tired. He was patient and completed the admission interview with writer. He reported that he has been to treatments since 1990 and he is unsure of how many times. He does use other substances such as cocaine and Marijuana. Presently, he reports no withdrawal symptoms and physically has no shakiness / tremors. Patient introduced to the unit, he was given the admission folder / packet and he signed the admission form which was given to the [facility] to file. Patient is in bed resting-he declined nicotine gum and patch tonight, noting, he will get it tomorrow. MSSA score at 5-no prn valium administered.    /83 (BP Location: Left arm, Patient Position: Sitting, Cuff Size: Adult Regular)   Pulse 82   Temp 98.1  F (36.7  C) (Oral)   Resp 16   Ht 1.829 m (6')   Wt 78.2 kg (172 lb 8 oz)   SpO2 99%   BMI 23.40 kg/m

## 2023-08-19 NOTE — PLAN OF CARE
Goal Outcome Evaluation:    MSSA scores of 3/3 patient  did not require medication for alcohol withdrawal and is observed to sleep throughout the noc

## 2023-08-19 NOTE — CONSULTS
[facility]  Internal Medicine Consult     Adonay Godwin MRN# [id number]   Age: 57 year old YOB: 1965     Date of Admission: 8/18/2023  Date of Consult: 8/19/2023    Primary Care Provider: No Ref-Primary, Physician    Requesting Service: Behavioral Health - Katherine Chavez MD  Reason for Consult: General Medical Evaluation      SUBJECTIVE   CC:   HTN   Assessment and Plan/Recommendations:   Adonay Godwin is a 57 year old male with history of HTN, CKD, anxiety, and etoh abuse who was admitted to station 3A with etoh withdrawal     Alcohol dependency with acute withdrawal, anxiety: Drinking 6 beers plus shots daily PTA. No h/o withdrawal seizures  - Management per psych    HTN: PTA on losartan. BP stable. Continue PTA meds with hold parameters    CKD II: BL Cr 1-1.3 since 2009 per chart review. Cr 1.29 on presentation. Avoid nephrotoxic meds      Currently, medically stable and internal medicine will sign off. Please contact if future questions or concerns arise. Thank you for the opportunity to be a part of this patient's care.      Mary Landry PA-C  Internal Medicine DEON Hospitalist  Page job code [phone] (3B), [phone] (3A), or [phone] ([facility] and )  Text paging via [employer] is appreciated  August 19, 2023         HPI:   Adonay Godwin is a 57 year old male with history of HTN, CKD, anxiety, and etoh abuse who was admitted to station 3A with etoh withdrawal     Patient declines to speak with writer. He denies any acute concerns. He would like to rest. Consult terminated at patient request.       Past Medical History:     Past Medical History:   Diagnosis Date    Hypertension     Substance abuse (H)         Reviewed and updated in [employer].     Past Surgical History:      Past Surgical History:   Procedure Laterality Date    ORTHOPEDIC SURGERY           Social History:     Social History     Tobacco Use    Smoking status: Every Day     Packs/day: 0.25     Types: Cigarettes     "Smokeless tobacco: Never   Substance Use Topics    Alcohol use: Yes    Drug use: Yes     Types: Marijuana, \"Crack\" cocaine        Family History:   No family history on file.      Allergies:   No Known Allergies      Medications:   Reviewed. Please see MAR     Review of Systems:   10 point ROS of systems including Constitutional, Eyes, Respiratory, Cardiovascular, Gastroenterology, Genitourinary, Integumentary, Muscularskeletal, Psychiatric were all negative except for pertinent positives noted in my HPI.    OBJECTIVE   Physical Exam:   Vitals were reviewed  Blood pressure 123/76, pulse 81, temperature 98.7  F (37.1  C), temperature source Oral, resp. rate 16, height 1.829 m (6'), weight 78.2 kg (172 lb 8 oz), SpO2 100 %.  General: Alert and oriented x3, laying in bed  HEENT: Anicteric sclera, MMM  Cardiovascular: RRR, S1S2. No murmur noted  Lungs: CTAB without wheezing or crackles   GI: Patient refused  Vascular: Patient refused  Neurologic: No focal deficits, refused exam  Skin: No jaundice, rashes, or lesions on exposed skin        Data:        Lab Results   Component Value Date     08/18/2023    Lab Results   Component Value Date    CHLORIDE 105 08/18/2023    Lab Results   Component Value Date    BUN 11.7 08/18/2023      Lab Results   Component Value Date    POTASSIUM 4.3 08/18/2023    Lab Results   Component Value Date    CO2 25 08/18/2023    Lab Results   Component Value Date    CR 1.29 08/18/2023        Lab Results   Component Value Date    WBC 4.5 08/18/2023    HGB 15.6 08/18/2023    HCT 45.7 08/18/2023    MCV 94 08/18/2023     08/18/2023     Lab Results   Component Value Date    WBC 4.5 08/18/2023      "

## 2023-08-20 LAB
CHOLEST SERPL-MCNC: 190 MG/DL
HDLC SERPL-MCNC: 59 MG/DL
LDLC SERPL CALC-MCNC: 108 MG/DL
NONHDLC SERPL-MCNC: 131 MG/DL
TRIGL SERPL-MCNC: 114 MG/DL

## 2023-08-20 PROCEDURE — 250N000013 HC RX MED GY IP 250 OP 250 PS 637: Performed by: PSYCHIATRY & NEUROLOGY

## 2023-08-20 PROCEDURE — 128N000004 HC R&B CD ADULT

## 2023-08-20 PROCEDURE — 250N000013 HC RX MED GY IP 250 OP 250 PS 637: Performed by: PHYSICIAN ASSISTANT

## 2023-08-20 PROCEDURE — 80061 LIPID PANEL: CPT | Performed by: PSYCHIATRY & NEUROLOGY

## 2023-08-20 PROCEDURE — 36415 COLL VENOUS BLD VENIPUNCTURE: CPT | Performed by: PSYCHIATRY & NEUROLOGY

## 2023-08-20 RX ADMIN — LOSARTAN POTASSIUM 50 MG: 50 TABLET, FILM COATED ORAL at 10:04

## 2023-08-20 RX ADMIN — FOLIC ACID 1 MG: 1 TABLET ORAL at 10:04

## 2023-08-20 RX ADMIN — MULTIPLE VITAMINS W/ MINERALS TAB 1 TABLET: TAB at 10:03

## 2023-08-20 RX ADMIN — QUETIAPINE FUMARATE 50 MG: 50 TABLET ORAL at 21:10

## 2023-08-20 RX ADMIN — THIAMINE HCL TAB 100 MG 100 MG: 100 TAB at 10:04

## 2023-08-20 ASSESSMENT — ACTIVITIES OF DAILY LIVING (ADL)
ADLS_ACUITY_SCORE: 28
HYGIENE/GROOMING: INDEPENDENT
ADLS_ACUITY_SCORE: 28
ADLS_ACUITY_SCORE: 28
HYGIENE/GROOMING: INDEPENDENT
ORAL_HYGIENE: INDEPENDENT
ADLS_ACUITY_SCORE: 28
LAUNDRY: WITH SUPERVISION
ADLS_ACUITY_SCORE: 28
DRESS: INDEPENDENT

## 2023-08-20 ASSESSMENT — ANXIETY QUESTIONNAIRES
6. BECOMING EASILY ANNOYED OR IRRITABLE: NOT AT ALL
GAD7 TOTAL SCORE: 6
7. FEELING AFRAID AS IF SOMETHING AWFUL MIGHT HAPPEN: SEVERAL DAYS
2. NOT BEING ABLE TO STOP OR CONTROL WORRYING: MORE THAN HALF THE DAYS
GAD7 TOTAL SCORE: 6
3. WORRYING TOO MUCH ABOUT DIFFERENT THINGS: SEVERAL DAYS
1. FEELING NERVOUS, ANXIOUS, OR ON EDGE: MORE THAN HALF THE DAYS
4. TROUBLE RELAXING: NOT AT ALL
5. BEING SO RESTLESS THAT IT IS HARD TO SIT STILL: NOT AT ALL
IF YOU CHECKED OFF ANY PROBLEMS ON THIS QUESTIONNAIRE, HOW DIFFICULT HAVE THESE PROBLEMS MADE IT FOR YOU TO DO YOUR WORK, TAKE CARE OF THINGS AT HOME, OR GET ALONG WITH OTHER PEOPLE: SOMEWHAT DIFFICULT

## 2023-08-20 ASSESSMENT — PATIENT HEALTH QUESTIONNAIRE - PHQ9: SUM OF ALL RESPONSES TO PHQ QUESTIONS 1-9: 13

## 2023-08-20 NOTE — PROGRESS NOTES
Writer attempted to complete patients assessment patient stated he was two tired and will complete the assessment first thing Monday morning, writer will fully complete assessment at that time.

## 2023-08-20 NOTE — PLAN OF CARE
Patient is out of detox, slept for the most part of the night.    Problem: Alcohol Withdrawal  Goal: Alcohol Withdrawal Symptom Control  8/20/2023 0704 by Hollie Regalado, RN  Outcome: Progressing  8/19/2023 1736 by Hollie Regalado, RN  Outcome: Progressing     Problem: Sleep Disturbance  Goal: Adequate Sleep/Rest  Outcome: Progressing   Goal Outcome Evaluation:    Plan of Care Reviewed With: patient

## 2023-08-20 NOTE — DISCHARGE INSTRUCTIONS
Behavioral Discharge Planning and Instructions  THANK YOU FOR CHOOSING [address]  [phone]    Summary: You were admitted to Station 3A on 08/18/2023 for detoxification from Alcohol Use Disorder.  A medical exam was performed that included lab work. You have met with a  and opted to declining all case management services and resources at this time.  Please take care and make your recovery a daily priority, Adonay!  It was a pleasure working with you and the entire treatment team here wishes you the very best in your recovery!     Recommendation:  Please seek CD treatment as needed and required. Please follow any and all recommendations as needed and requested.     Main Diagnoses:  Per Dr. Gia Cullen MD;  303.90 (F10.20) Alcohol Use Disorder Severe    Major Treatments, Procedures and Findings:  You were treated for alcohol detoxification using Ativan.  You started to complete  a chemical dependency assessment however, did not fully finish. You had labs drawn and those results were reviewed with you. Please take a copy of your lab work with you to your next primary care provider appointment.    Symptoms to Report:  If you experience more anxiety, confusion, sleeplessness, deep sadness or thoughts of suicide, notify your treatment team or notify your primary care provider. IF ANY OF THE SYMPTOMS YOU ARE EXPERIENCING ARE A MEDICAL EMERGENCY CALL 911 IMMEDIATELY.     Lifestyle Adjustment: Adjust your lifestyle to get enough sleep, relaxation, exercise and good nutrition. Continue to develop healthy coping skills to decrease stress and promote a sober living environment. Do not use mood altering substances including alcohol, illegal drugs or addictive medications other than what is currently prescribed.     Disposition: home    Facts about COVID19 at www.cdc.gov/COVID19 and www.[place].gov/covid19    Keeping hands clean is one of the most important steps we can take to avoid getting sick and  spreading germs to others.  Please wash your hands frequently and lather with soap for at least 20 seconds!    Follow-up Appointment: Patient states he will make a follow-up appointment after discharge  Appointment Date/Time: ***    Psychiatrist/Primary Care Giver: ***    Address: ***    Phone Number: ***      Recovery apps for your phone to locate current in person and zoom recovery meetings  [place] - meeting carmen  AA  - meeting carmen  Meeting guide - meeting carmen  Quick NA meeting - meeting carmen  [place]- has various apps    Resources:  Some AA/NA meetings are being held online however most have returned to in-person or a hybrid combination please check online to verify*  Need Support Now? If you or someone you know is struggling or in crisis, help is available. Call or text [employer]orgi or chat BlazeMeter  AA meetings search for them at: https://aa-intergroup.org (worldwide meeting listings)  AA meetings for [place] area can be found online at: https://a[place].org (click local online meetings listings)  NA meetings for [place] area can be found online at: https://www.nam[place].org  (click find a meeting)  AA and NA Sponsors are excellent resources for support and you can find one at any support group meeting.   Alcoholics Anonymous (https://aa.org/): for information 24 hours/day  AA Intergroup service office in [place] (http://www.aastpaul.org/) 479.649.8673  AA Intergroup service office in [facility]: [phone]. (http://www.aamin[place].org/)  Narcotics Anonymous (www.nam[place].org) (131) 175-1503  https://aafairviewriverside.org/meetings  SMART Recovery - self management for addiction recovery:  www.smartrecovery.org  Pathways ~ A Health Crisis Resource & Support Center:  943.619.1000.  https://prescribetoprevent.org/patient-education/videos/  http://www.harmreduction.org  [facility] 463-021-7091  Support Group:  AA/NA and Sponsor/support.  National [place] on Mental Illness  (www.[place].jacy.org): 487.534.9145 or 791-746-1813.  Alcoholics Anonymous (www.alcoholics-anonymous.org): Check your phone book for your local chapter.  Suicide Awareness Voices of Education (SAVE) (www.save.org): 173-523-AYPI (2783)  National Suicide Prevention Line (www.mentalhealthmn.org): 319-237-CCTL (9627)  Mental Health Consumer/Survivor Network of [place] (www.mhcsn.net): 243.142.2963 or 472-116-1905  Mental Health Association of [place] (www.mentalhealth.org): 773.802.5651 or 960-148-6267   Substance Abuse and Mental Health Services (www.samhsa.gov)  [place] Opioid Prevention Coalition: www.opioidcoalition.org    [place] Recovery Connection (MRC)  [facility] connects people seeking recovery to resources that help foster and sustain long-term recovery.  Whether you are seeking resources for treatment, transportation, housing, job training, education, health care or other pathways to recovery, [facility] is a great place to start.  164.141.6446.  www.[place] Pod casts for nutrition and wellness  Listen on Apple Podcasts  Dishing Up Nutrition   [facility]   Nutrition       Understand the connection between what you eat and how you feel. Hosted by licensed nutritionists and dietitians from [employer] [facility] we share practical, real-life solutions for healthier living through nutrition.     General Medication Instructions:   See your medication sheet(s) for instructions.   Take all medications as prescribed.  Make no changes unless your primary care provider suggests them.   Go to all your primary care provider visits.  Be sure to have all your required lab tests. This way, your medicines can be refilled on time.  Do not use any forms of alcohol.    Please Note:  If you have any questions at anytime after you are discharged please [facility] at [phone].  [facility] Stevie, Behavioral Intake [phone]  Medical Records call  [phone]  Outpatient Behavioral Intake call [phone]  [facility] Wait List/Bed Availability call [phone]    Please remember to take all of your behavioral discharge planning and lab paperwork to any follow up appointments, it contains your lab results, diagnosis, medication list and discharge recommendations.      THANK YOU FOR CHOOSING [facility]

## 2023-08-20 NOTE — PLAN OF CARE
Behavioral Team Discussion: (8/20/2023)    Continued Stay Criteria/Rationale: Patient admitted for  aLCOHOL Use Disorder.  Plan: The following services will be provided to the patient; psychiatric assessment, medication management, therapeutic milieu, individual and group support, and skills groups.   Participants: 3A Provider: Dr. Gia Cullen MD; 3A RN: Kat Fischer, RN; 3A CM's: Hemal Chase.  Summary/Recommendation: Providers will assess today for treatment recommendations, discharge planning, and aftercare plans. CM will meet with pt for discharge planning.   Medical/Physical: Patient denies any medical or physical concerns at this time.  Precautions:   Behavioral Orders   Procedures    Code 1 - Restrict to Unit    Fall precautions    Routine Programming     As clinically indicated    Status 15     Every 15 minutes.    Withdrawal precautions     Rationale for change in precautions or plan: N/A  Progress: Initial.    ASAM Dimension Scale Ratings:  Dimension 1: 3 Client tolerates and marisol with withdrawal discomfort poorly. Client has severe intoxication, such that the client endangers self or others, or intoxication has not abated with less intensive levels of services. Client displays severe signs and symptoms; or risk of severe, but manageable withdrawal; or withdrawal worsening despite detox at less intensive level.  Dimension 2: 1 Client tolerates and marisol with physical discomfort and is able to get the services that the client needs.  Dimension 3: 2 Client has difficulty with impulse control and lacks coping skills. Client has thoughts of suicide or harm to others without means; however, the thoughts may interfere with participation in some treatment activities. Client has difficulty functioning in significant life areas. Client has moderate symptoms of emotional, behavioral, or cognitive problems. Client is able to participate in most treatment activities.  Dimension 4: 3 Client displays inconsistent  compliance, minimal awareness of either the client's addiction or mental disorder, and is minimally cooperative.  Dimension 5: 3 Client has poor recognition and understanding of relapse and recidivism issues and displays moderately high vulnerability for further substance use or mental health problems. Client has few coping skills and rarely applies coping skills.  Dimension 6: 3 Client is not engaged in structured, meaningful activity and the client's peers, family, significant other, and living environment are unsupportive, or there is significant criminal justice system involvement.

## 2023-08-20 NOTE — PROGRESS NOTES
Patient is out of detox.    Patient has not required any valium for alcohol detox since for over 24 hours. All MSSA scores since that time have been less than 8. Pt is now removed from alcohol detox monitoring status.

## 2023-08-20 NOTE — PLAN OF CARE
Pt remains out of detox per unit protocol. Pt denies and does not present with withdrawal symptoms this shift.   Pt denied anxiety and depression. Pt denied SI and SIB. Pt denied pain. Pt alert. Pt independent with steady gait.   Pt presents with flat affect, cooperative. Pt visible in the milieu - withdrawn to self, TV, group. Pt able to shower during 1600 hour.   Pt reports he ate 100% of dinner.   Pt compliant with scheduled medications. No PRN medications given or requested.   At 1600 assessment, psych associate recorded HR 54. Writer rechecked - HR 59 - Pt denies dizziness, lightheadedness, changes in vision. Latest VS: HR 63, /84  Precautions: fall    Problem: Adult Behavioral Health Plan of Care  Goal: Plan of Care Review  Outcome: Progressing  Flowsheets (Taken 8/20/2023 1603)  Patient Agreement with Plan of Care: agrees     Problem: Adult Behavioral Health Plan of Care  Goal: Optimized Coping Skills in Response to Life Stressors  Outcome: Progressing     Problem: Suicidal Behavior  Goal: Suicidal Behavior is Absent or Managed  Outcome: Progressing

## 2023-08-20 NOTE — PLAN OF CARE
"  Problem: Plan of Care - These are the overarching goals to be used throughout the patient stay.    Goal: Readiness for Transition of Care  Outcome: Progressing  Flowsheets (Taken 8/20/2023 1246)  Concerns to be Addressed:   coping/stress   mental health   relationship     Problem: Alcohol Withdrawal  Goal: Readiness for Change Identified  Outcome: Progressing   Goal Outcome Evaluation:       Patient has been isolative to his room, and sleeping much of the time. Patient did not get up for medications, reported he would do it \"Later\" but then writer approached him at 10am offering to bring the medications to him and he said he would take them. Patient was asked if he wanted to be discharged since he is out of Detox and he said no, that he was going upstairs for treatment, but doesn't know when he will be going. Patient reported that he has been sleeping a lot because of everything he has going on in his life. He reported that his wife told him some really hurtful things late last week and he is still at a loss of what to think about this and so he feels he would rather sleep than be awake and thinking about everything. He said that using sleep to cope has been working fine for him. He denied depression and SI. He also reported he didn't want to talk about it any further. Patient has been calm and cooperative when up, however irritable.                 "

## 2023-08-20 NOTE — PROGRESS NOTES
RN reporting pt is now stating he plans to go upstairs. Writer checked in with pt and pt confirmed he would like to go to [facility]. Writer did not find a recent CD assessment on file. Pt shown paperwork to complete for CD assessment, encouraged to complete ASAP. Pt has Eligio CHOUDHARY.     Kami Childress, [facility], [facility]

## 2023-08-21 VITALS
BODY MASS INDEX: 23.36 KG/M2 | DIASTOLIC BLOOD PRESSURE: 84 MMHG | HEIGHT: 72 IN | TEMPERATURE: 97.9 F | WEIGHT: 172.5 LBS | SYSTOLIC BLOOD PRESSURE: 127 MMHG | RESPIRATION RATE: 16 BRPM | HEART RATE: 48 BPM | OXYGEN SATURATION: 100 %

## 2023-08-21 PROCEDURE — 250N000013 HC RX MED GY IP 250 OP 250 PS 637: Performed by: PSYCHIATRY & NEUROLOGY

## 2023-08-21 PROCEDURE — 250N000013 HC RX MED GY IP 250 OP 250 PS 637: Performed by: PHYSICIAN ASSISTANT

## 2023-08-21 PROCEDURE — 99239 HOSP IP/OBS DSCHRG MGMT >30: CPT | Performed by: PSYCHIATRY & NEUROLOGY

## 2023-08-21 RX ORDER — LANOLIN ALCOHOL/MO/W.PET/CERES
100 CREAM (GRAM) TOPICAL DAILY
Qty: 30 TABLET | Refills: 0 | Status: SHIPPED | OUTPATIENT
Start: 2023-08-21

## 2023-08-21 RX ORDER — LOSARTAN POTASSIUM 50 MG/1
50 TABLET ORAL DAILY
Qty: 30 TABLET | Refills: 0 | Status: SHIPPED | OUTPATIENT
Start: 2023-08-21

## 2023-08-21 RX ORDER — QUETIAPINE FUMARATE 50 MG/1
50 TABLET, FILM COATED ORAL AT BEDTIME
Qty: 30 TABLET | Refills: 0 | Status: SHIPPED | OUTPATIENT
Start: 2023-08-21

## 2023-08-21 RX ORDER — MULTIPLE VITAMINS W/ MINERALS TAB 9MG-400MCG
1 TAB ORAL DAILY
Qty: 30 TABLET | Refills: 0 | Status: SHIPPED | OUTPATIENT
Start: 2023-08-21

## 2023-08-21 RX ADMIN — LOSARTAN POTASSIUM 50 MG: 50 TABLET, FILM COATED ORAL at 08:45

## 2023-08-21 RX ADMIN — FOLIC ACID 1 MG: 1 TABLET ORAL at 08:45

## 2023-08-21 RX ADMIN — MULTIPLE VITAMINS W/ MINERALS TAB 1 TABLET: TAB at 08:45

## 2023-08-21 RX ADMIN — THIAMINE HCL TAB 100 MG 100 MG: 100 TAB at 08:45

## 2023-08-21 ASSESSMENT — ACTIVITIES OF DAILY LIVING (ADL)
ADLS_ACUITY_SCORE: 28
ADLS_ACUITY_SCORE: 28
HYGIENE/GROOMING: INDEPENDENT
ADLS_ACUITY_SCORE: 28
ORAL_HYGIENE: INDEPENDENT
ADLS_ACUITY_SCORE: 28
ADLS_ACUITY_SCORE: 28
DRESS: INDEPENDENT
ADLS_ACUITY_SCORE: 28
ADLS_ACUITY_SCORE: 28

## 2023-08-21 NOTE — PROGRESS NOTES
Writer met with patient on this date to complete his assessment and get him upstairs to residential treatment at [facility], patient stated he changed his mind and does not want to go to [facility] at all. Patient stated he does not want to be confined at this time. Writer explained to patient [facility] is not a looked facility and there will be more freedom when he get to [facility], patient still denied saying he just wants to go home and figure things out on his own. Writer informed patients nurse about the change.

## 2023-08-21 NOTE — PLAN OF CARE
Goal Outcome Evaluation:  Patient is out of detox observation for alcohol withdrawal and is observed to sleep throughout the noc.

## 2023-08-21 NOTE — PROGRESS NOTES
Patient out of detox status.Appetite good. Fluids encouraged. Offers no complaints of physical discomfort. Denies thoughts of self harm. Requesting discharge. Discharged with personal belongings to home. Discharge medication instructions reviewed. Discharge medications instructions reviewed. Patient verbalizes understanding.

## 2023-08-21 NOTE — DISCHARGE SUMMARY
Adonay Godwin MRN# [id number]   Age: 57 year old YOB: 1965     Date of Admission:  8/18/2023  Date of Discharge:  8/21/2023  Admitting Physician:  Katherine Chavez MD  Discharge Physician:  Gia Cullen MD      DISCHARGE  DX  Alcohol abuse  Alcohol withdrawal  Cocaine abuse  History of bipolar affective disorder           Event Leading to Hospitalization:     See Admission note by admitting provider for patient encounter. for additional details.          Hospital Course:   PATIENT was admitted [facility] attending  under DR cullen, please review the detailed admit note on 8/19/23   The patient was placed under status 15 (15 minute checks) to ensure patient safety.   MSSA protocol was initiated due to the patient's history of alcohol abuse and concern for withdrawal symptoms.  CBC, BMP and utox obtained.    All outpatient medications were continued    PATIENTdid participate in groups and was visible in the milieu.     The patient's symptoms of withdrawal improved.     Patients energy motivation , sleep appetite improved.  Pt completed detox . It was un eventful.      Discussed with patient medications for craving.  Pt is willing to take  antabuse/naltrexone/campral    Spoke with patient about triggers coping skills relapse prevention.    CONSULTS DONE DURING PATIENTS HOSPITALIZATION.  Patient was seen by medicine on date8/19/23    This as per their medical consult    Adonay Godwin is a 57 year old male with history of HTN, CKD, anxiety, and etoh abuse who was admitted to [facility] with etoh withdrawal      Alcohol dependency with acute withdrawal, anxiety: Drinking 6 beers plus shots daily PTA. No h/o withdrawal seizures  - Management per psych     HTN: PTA on losartan. BP stable. Continue PTA meds with hold parameters     CKD II: BL Cr 1-1.3 since 2009 per chart review. Cr 1.29 on presentation. Avoid nephrotoxic meds        Currently, medically stable and internal medicine  will sign off. Please contact if future questions or concerns arise. Thank you for the opportunity to be a part of this patient's care.                       Labs:reviewed with patient       Recent Results (from the past 48 hour(s))   GGT    Collection Time: 08/19/23  8:40 AM   Result Value Ref Range    GGT 24 8 - 61 U/L   TSH    Collection Time: 08/19/23  8:40 AM   Result Value Ref Range    TSH 0.19 (L) 0.30 - 4.20 uIU/mL   Lipid panel reflex to direct LDL    Collection Time: 08/20/23  7:33 AM   Result Value Ref Range    Cholesterol 190 <200 mg/dL    Triglycerides 114 <150 mg/dL    Direct Measure HDL 59 >=40 mg/dL    LDL Cholesterol Calculated 108 (H) <=100 mg/dL    Non HDL Cholesterol 131 (H) <130 mg/dL         Recent Results (from the past 240 hour(s))   Comprehensive metabolic panel    Collection Time: 08/18/23  1:54 PM   Result Value Ref Range    Sodium 140 136 - 145 mmol/L    Potassium 4.3 3.4 - 5.3 mmol/L    Chloride 105 98 - 107 mmol/L    Carbon Dioxide (CO2) 25 22 - 29 mmol/L    Anion Gap 10 7 - 15 mmol/L    Urea Nitrogen 11.7 8.0 - 23.0 mg/dL    Creatinine 1.29 (H) 0.67 - 1.17 mg/dL    Calcium 9.8 8.6 - 10.0 mg/dL    Glucose 100 (H) 70 - 99 mg/dL    Alkaline Phosphatase 76 40 - 129 U/L    AST 21 0 - 45 U/L    ALT 18 0 - 70 U/L    Protein Total 7.8 6.4 - 8.3 g/dL    Albumin 4.6 3.5 - 5.2 g/dL    Bilirubin Total 1.0 <=1.2 mg/dL    GFR Estimate 64 >60 mL/min/1.73m2   Lipase    Collection Time: 08/18/23  1:54 PM   Result Value Ref Range    Lipase 32 13 - 60 U/L   Ethyl Alcohol Level    Collection Time: 08/18/23  1:54 PM   Result Value Ref Range    Alcohol ethyl <0.01 <=0.01 g/dL   CBC with platelets and differential    Collection Time: 08/18/23  1:54 PM   Result Value Ref Range    WBC Count 4.5 4.0 - 11.0 10e3/uL    RBC Count 4.87 4.40 - 5.90 10e6/uL    Hemoglobin 15.6 13.3 - 17.7 g/dL    Hematocrit 45.7 40.0 - 53.0 %    MCV 94 78 - 100 fL    MCH 32.0 26.5 - 33.0 pg    MCHC 34.1 31.5 - 36.5 g/dL    RDW 12.7  10.0 - 15.0 %    Platelet Count 263 150 - 450 10e3/uL    % Neutrophils 46 %    % Lymphocytes 43 %    % Monocytes 8 %    % Eosinophils 2 %    % Basophils 1 %    % Immature Granulocytes 0 %    NRBCs per 100 WBC 0 <1 /100    Absolute Neutrophils 2.1 1.6 - 8.3 10e3/uL    Absolute Lymphocytes 1.9 0.8 - 5.3 10e3/uL    Absolute Monocytes 0.4 0.0 - 1.3 10e3/uL    Absolute Eosinophils 0.1 0.0 - 0.7 10e3/uL    Absolute Basophils 0.0 0.0 - 0.2 10e3/uL    Absolute Immature Granulocytes 0.0 <=0.4 10e3/uL    Absolute NRBCs 0.0 10e3/uL   Drug abuse screen 1 urine (ED)    Collection Time: 08/18/23  2:08 PM   Result Value Ref Range    Amphetamines Urine Screen Negative Screen Negative    Barbituates Urine Screen Negative Screen Negative    Benzodiazepine Urine Screen Negative Screen Negative    Cannabinoids Urine Screen Positive (A) Screen Negative    Cocaine Urine Screen Positive (A) Screen Negative    Opiates Urine Screen Negative Screen Negative   GGT    Collection Time: 08/19/23  8:40 AM   Result Value Ref Range    GGT 24 8 - 61 U/L   TSH    Collection Time: 08/19/23  8:40 AM   Result Value Ref Range    TSH 0.19 (L) 0.30 - 4.20 uIU/mL   Lipid panel reflex to direct LDL    Collection Time: 08/20/23  7:33 AM   Result Value Ref Range    Cholesterol 190 <200 mg/dL    Triglycerides 114 <150 mg/dL    Direct Measure HDL 59 >=40 mg/dL    LDL Cholesterol Calculated 108 (H) <=100 mg/dL    Non HDL Cholesterol 131 (H) <130 mg/dL            Because this patient meets criteria for an Alcohol Use Disorder, I performed the following brief intervention on the date of this note:              1) Expressed concern that the patient is drinking at unhealthy levels known to increase their risk of alcohol related problems              2) Gave feedback linking alcohol use and health, including personalized feedback explaining how alcohol use can interact with their medical and/or psychiatric problems, and with prescribed medications.              3)  Advised patient to abstain.    PT counseled on nicotine cessation and nicotine replacement provided    Counseled the patient on the importance of having a recovery program in addition to medication to manage recovery.  Components include avoiding isolating, having willingness to change, avoiding triggers and managing cravings. Encouraged having some type of sober network and practicing honesty with trusted support person(s).     Discussed with patient many issues of addiction,triggers, relapse, and establishing a solid recovery program.    DISCHARGE MENTAL STATUS EXAMINATION:  The patient is alert, oriented x3.  Good fund of knowledge.  Good use of language.  Recent and remote memory, language, fund of knowledge are all adequate.  Euthymic mood congruent affect  Speech normal rate/rhythm linear tp no loose asso,The patient does not have any active suicidal or homicidal ideation.  Does not have any auditory or visual hallucination.  Fair insight/judgment At this time, the patient was stable to be discharged.        Pt was not determined to not be a danger to himself or others. At the current time of discharge, the patient does not meet criteria for involuntary hospitalization. On the day of discharge, the patient reports that they do not have suicidal or homicidal ideation and would never hurt themselves or others. Steps taken to minimize risk include: assessing patient s behavior and thought process daily during hospital stay, discharging patient with adequate plan for follow up for mental and physical health and discussing safety plan of returning to the hospital should the patient ever have thoughts of harming themselves or others. Therefore, based on all available evidence including the factors cited above, the patient does not appear to be at imminent risk for self-harm, and is appropriate for outpatient level of care.     Educated about side effects/risk vs benefits /alternative including non treatment.Pt  "consented to be on medication.     .Total time spent on discharge summary more than 35 min  More than  20 min  planning, coordination of care, medication reconciliation and performance of physical exam on day of discharge.Care was coordinated with unit RN and unit therapist        Disposition: lp     Facts about COVID19 at www.cdc.gov/COVID19 and www.[place].gov/covid19     Keeping hands clean is one of the most important steps we can take to avoid getting sick and spreading germs to others.  Please wash your hands frequently and lather with soap for at least 20 seconds!     Medical Follow-Up:[facility] in 3-4 weeks        Treatment Follow-Up:lp  .        \"Much or all of the text in this note was generated through the use of Dragon Dictate voice to text software. Errors in spelling or words which appear to be out of contact are unintentional, may be present due having escaped editing\"     Telemedicine Visit: The patient's condition can be safely assessed and treated via synchronous audio and visual telemedicine encounter.   Start Time: 8.27am  Stop Time:8.31am  Reason for Telemedicine Visit: Covid-19   Originating Site (Patient Location): [facility]  Distant Site (Provider Location): Provider Remote Setting   Consent: The patient/guardian has verbally consented to: the potential risks and benefits of telemedicine (video visit) versus in person care; bill my insurance or make self-payment for services provided; and responsibility for payment of non-covered services.   Mode of Communication: Video Conference via polycom  As the provider I attest to compliance with applicable laws and regulations related to telemedicine.       The patient/guardian has been notified of the following:   This telemedicine visit is conducted live between you and your clinician. We have found that certain health care needs can be provided without the need for a physical exam. This service lets us provide the care you need with a telemedicine " conversation.